# Patient Record
Sex: MALE | Race: OTHER | HISPANIC OR LATINO | URBAN - METROPOLITAN AREA
[De-identification: names, ages, dates, MRNs, and addresses within clinical notes are randomized per-mention and may not be internally consistent; named-entity substitution may affect disease eponyms.]

---

## 2023-09-12 ENCOUNTER — EMERGENCY (EMERGENCY)
Facility: HOSPITAL | Age: 33
LOS: 1 days | Discharge: HOME | End: 2023-09-12
Attending: PERSONAL EMERGENCY RESPONSE ATTENDANT | Admitting: PERSONAL EMERGENCY RESPONSE ATTENDANT
Payer: COMMERCIAL

## 2023-09-12 VITALS
SYSTOLIC BLOOD PRESSURE: 150 MMHG | TEMPERATURE: 98 F | HEART RATE: 69 BPM | OXYGEN SATURATION: 100 % | RESPIRATION RATE: 16 BRPM | DIASTOLIC BLOOD PRESSURE: 97 MMHG

## 2023-09-12 VITALS — TEMPERATURE: 99 F

## 2023-09-12 DIAGNOSIS — F41.9 ANXIETY DISORDER, UNSPECIFIED: ICD-10-CM

## 2023-09-12 LAB
ALBUMIN SERPL ELPH-MCNC: 4.7 G/DL — SIGNIFICANT CHANGE UP (ref 3.3–5)
ALP SERPL-CCNC: 102 U/L — SIGNIFICANT CHANGE UP (ref 40–120)
ALT FLD-CCNC: 21 U/L — SIGNIFICANT CHANGE UP (ref 4–41)
ANION GAP SERPL CALC-SCNC: 17 MMOL/L — HIGH (ref 7–14)
APPEARANCE UR: ABNORMAL
AST SERPL-CCNC: 20 U/L — SIGNIFICANT CHANGE UP (ref 4–40)
BASOPHILS # BLD AUTO: 0.04 K/UL — SIGNIFICANT CHANGE UP (ref 0–0.2)
BASOPHILS NFR BLD AUTO: 0.6 % — SIGNIFICANT CHANGE UP (ref 0–2)
BILIRUB SERPL-MCNC: 0.5 MG/DL — SIGNIFICANT CHANGE UP (ref 0.2–1.2)
BILIRUB UR-MCNC: NEGATIVE — SIGNIFICANT CHANGE UP
BUN SERPL-MCNC: 12 MG/DL — SIGNIFICANT CHANGE UP (ref 7–23)
CALCIUM SERPL-MCNC: 9.5 MG/DL — SIGNIFICANT CHANGE UP (ref 8.4–10.5)
CHLORIDE SERPL-SCNC: 99 MMOL/L — SIGNIFICANT CHANGE UP (ref 98–107)
CO2 SERPL-SCNC: 23 MMOL/L — SIGNIFICANT CHANGE UP (ref 22–31)
COLOR SPEC: YELLOW — SIGNIFICANT CHANGE UP
CREAT SERPL-MCNC: 0.82 MG/DL — SIGNIFICANT CHANGE UP (ref 0.5–1.3)
DIFF PNL FLD: NEGATIVE — SIGNIFICANT CHANGE UP
EGFR: 117 ML/MIN/1.73M2 — SIGNIFICANT CHANGE UP
EOSINOPHIL # BLD AUTO: 0.23 K/UL — SIGNIFICANT CHANGE UP (ref 0–0.5)
EOSINOPHIL NFR BLD AUTO: 3.3 % — SIGNIFICANT CHANGE UP (ref 0–6)
GLUCOSE SERPL-MCNC: 137 MG/DL — HIGH (ref 70–99)
GLUCOSE UR QL: NEGATIVE MG/DL — SIGNIFICANT CHANGE UP
HCT VFR BLD CALC: 43 % — SIGNIFICANT CHANGE UP (ref 39–50)
HGB BLD-MCNC: 14.8 G/DL — SIGNIFICANT CHANGE UP (ref 13–17)
IANC: 4.91 K/UL — SIGNIFICANT CHANGE UP (ref 1.8–7.4)
IMM GRANULOCYTES NFR BLD AUTO: 1.3 % — HIGH (ref 0–0.9)
KETONES UR-MCNC: NEGATIVE MG/DL — SIGNIFICANT CHANGE UP
LEUKOCYTE ESTERASE UR-ACNC: ABNORMAL
LYMPHOCYTES # BLD AUTO: 1.27 K/UL — SIGNIFICANT CHANGE UP (ref 1–3.3)
LYMPHOCYTES # BLD AUTO: 18.1 % — SIGNIFICANT CHANGE UP (ref 13–44)
MCHC RBC-ENTMCNC: 26.5 PG — LOW (ref 27–34)
MCHC RBC-ENTMCNC: 34.4 GM/DL — SIGNIFICANT CHANGE UP (ref 32–36)
MCV RBC AUTO: 76.9 FL — LOW (ref 80–100)
MONOCYTES # BLD AUTO: 0.49 K/UL — SIGNIFICANT CHANGE UP (ref 0–0.9)
MONOCYTES NFR BLD AUTO: 7 % — SIGNIFICANT CHANGE UP (ref 2–14)
NEUTROPHILS # BLD AUTO: 4.91 K/UL — SIGNIFICANT CHANGE UP (ref 1.8–7.4)
NEUTROPHILS NFR BLD AUTO: 69.7 % — SIGNIFICANT CHANGE UP (ref 43–77)
NITRITE UR-MCNC: NEGATIVE — SIGNIFICANT CHANGE UP
NRBC # BLD: 0 /100 WBCS — SIGNIFICANT CHANGE UP (ref 0–0)
NRBC # FLD: 0 K/UL — SIGNIFICANT CHANGE UP (ref 0–0)
PCP SPEC-MCNC: SIGNIFICANT CHANGE UP
PH UR: 8.5 (ref 5–8)
PLATELET # BLD AUTO: 243 K/UL — SIGNIFICANT CHANGE UP (ref 150–400)
POTASSIUM SERPL-MCNC: 3.5 MMOL/L — SIGNIFICANT CHANGE UP (ref 3.5–5.3)
POTASSIUM SERPL-SCNC: 3.5 MMOL/L — SIGNIFICANT CHANGE UP (ref 3.5–5.3)
PROT SERPL-MCNC: 7.6 G/DL — SIGNIFICANT CHANGE UP (ref 6–8.3)
PROT UR-MCNC: SIGNIFICANT CHANGE UP MG/DL
RBC # BLD: 5.59 M/UL — SIGNIFICANT CHANGE UP (ref 4.2–5.8)
RBC # FLD: 12.7 % — SIGNIFICANT CHANGE UP (ref 10.3–14.5)
SODIUM SERPL-SCNC: 139 MMOL/L — SIGNIFICANT CHANGE UP (ref 135–145)
SP GR SPEC: 1.02 — SIGNIFICANT CHANGE UP (ref 1–1.03)
TROPONIN T, HIGH SENSITIVITY RESULT: <6 NG/L — SIGNIFICANT CHANGE UP
UROBILINOGEN FLD QL: 0.2 MG/DL — SIGNIFICANT CHANGE UP (ref 0.2–1)
WBC # BLD: 7.03 K/UL — SIGNIFICANT CHANGE UP (ref 3.8–10.5)
WBC # FLD AUTO: 7.03 K/UL — SIGNIFICANT CHANGE UP (ref 3.8–10.5)

## 2023-09-12 PROCEDURE — 99285 EMERGENCY DEPT VISIT HI MDM: CPT

## 2023-09-12 PROCEDURE — 90792 PSYCH DIAG EVAL W/MED SRVCS: CPT

## 2023-09-12 PROCEDURE — 93010 ELECTROCARDIOGRAM REPORT: CPT

## 2023-09-12 NOTE — ED BEHAVIORAL HEALTH ASSESSMENT NOTE - DESCRIPTION
cooperative, in good behavioral control throughout ED course  Vital Signs Last 24 Hrs  T(C): 37 (12 Sep 2023 13:23), Max: 37 (12 Sep 2023 13:23)  T(F): 98.6 (12 Sep 2023 13:23), Max: 98.6 (12 Sep 2023 13:23)  HR: 69 (12 Sep 2023 10:51) (69 - 69)  BP: 150/97 (12 Sep 2023 10:51) (150/97 - 150/97)  BP(mean): --  RR: 16 (12 Sep 2023 10:51) (16 - 16)  SpO2: 100% (12 Sep 2023 10:51) (100% - 100%)    Parameters below as of 12 Sep 2023 10:51  Patient On (Oxygen Delivery Method): room air denies see HPI

## 2023-09-12 NOTE — ED PROVIDER NOTE - NSFOLLOWUPINSTRUCTIONS_ED_ALL_ED_FT
You were seen in our department for Anxiety  Follow up with your PMD in 48-72 hours for further monitoring.  Follow up with [facility] within 1 week for further evaluation.  if you develop any chest pain, dizziness, high fevers, weakness, numbness, tingling, vision changes, or any worsening symptoms return to our ED for evaluation.

## 2023-09-12 NOTE — ED BEHAVIORAL HEALTH ASSESSMENT NOTE - RISK ASSESSMENT
Risk factors include history of anxiety/panic attacks.  Protective factors include no suicide attempts, no violence history, medication compliance, no access to guns,  no substance abuse, willingness to seek help, no suicidal ideation or homicidal ideation, future-oriented, identifies reasons for living.  Pt is not at acutely elevated risk of harm to self or others.

## 2023-09-12 NOTE — ED PROVIDER NOTE - OBJECTIVE STATEMENT
Attending MD Estrada.  Pt is a 34 yo male with pmhx of anxiety on propranolol and fluoxetine and had been rxed clonazepam remotely but had discontinued x 6 mos but has been needing this medication again x 3-4 days since escalating anxiety and fear he will die x 2 wks.  Denies any HI or desire to harm others.  Endorses passive death visualization, fear and perseveration re: own mortality without overt SI.  Pt with anxious affect.  Follows with outpt psychaitrist but came to ED today because he intermittently feels he will die and and gets chest tightness.  Sxs occur without clear provocation.  Lifetime non-smoker, no personal cards risk factors/hx, no illicit drug use.  No family hx of heart disease at a young age.

## 2023-09-12 NOTE — ED BEHAVIORAL HEALTH ASSESSMENT NOTE - SUMMARY
34 y/o male, single, noncaregiver, lives with sister, employed as a , self-reported history of CHANTE, no past psych admissions, currently prescribed Fluvoxamine, Propranolol, and Klonopin by PMD, currently no outpatient psychiatrist or therapist, no h/o self-injurious behavior or suicide attempts, no h/o violence or legal issues, no PMH, no h/o substance abuse, self-presents for anxiety/panic attacks.   It appears that patient has been experiencing panic attacks. Pt denies suicidal ideation, intent, or plan. He denies homicidal or violent ideation, intent, or plan. He does not appear manic or psychotic. He denies persistently depressed mood or anhedonia. He is cooperative and in good behavioral control. Pt does not present an acute danger to self or others and does not meet criteria for involuntary psychiatric admission at this time. Pt declines voluntary psychiatric admission at this time.

## 2023-09-12 NOTE — ED ADULT TRIAGE NOTE - CHIEF COMPLAINT QUOTE
Pt c/o anxiety and panic attacks X 2 weeks. Has been compliant with anxiety medications. Says "I been thinking something bad is going to happen to me". Denies SI, HI, auditory/visual hallucinations, or drugs/alcohol. Hx of anxiety/depression.

## 2023-09-12 NOTE — ED BEHAVIORAL HEALTH ASSESSMENT NOTE - DETAILS
call 911 or return to ED if symptoms worsen or if pt develops thoughts of harming self or others self-referred denies

## 2023-09-12 NOTE — ED BEHAVIORAL HEALTH ASSESSMENT NOTE - HPI (INCLUDE ILLNESS QUALITY, SEVERITY, DURATION, TIMING, CONTEXT, MODIFYING FACTORS, ASSOCIATED SIGNS AND SYMPTOMS)
34 y/o male, single, noncaregiver, lives with sister, employed as a , self-reported history of CHNATE, no past psych admissions, currently prescribed Fluvoxamine, Propranolol, and Klonopin by PMD, currently no outpatient psychiatrist or therapist, no h/o self-injurious behavior or suicide attempts, no h/o violence or legal issues, no PMH, no h/o substance abuse, self-presents for anxiety/panic attacks.    Patient states he came to the ED because "sometimes I feel like I can't breathe." He reports a history of CHANTE but reports increased anxiety and worry over the past 2-3 weeks. He denies acute stressors. States he is worried that he has a cardiac or pulmonary condition because he sometimes gets short of breath and his heart races when he is acutely anxious. He also fears that he is going to die. Pt denies SI/HI. Pt states it has been harder for him to function at work recently due to the anxiety. He reports intermittent sadness but denies persistently depressed mood or anhedonia. He denies manic or psychotic symptoms. He denies homicidal or violent ideation, intent, or plan. He denies substance use. His PMD prescribes Fluvoxamine 200 mg po daily, Propranolol 10 mg po BID, and Klonopin 0.5 mg po QHS. States his outpatient psychiatrist moved to [place] about 1 year ago.

## 2023-09-12 NOTE — ED BEHAVIORAL HEALTH ASSESSMENT NOTE - SAFETY PLAN ADDT'L DETAILS
Safety plan discussed with.../Education provided regarding environmental safety / lethal means restriction/Provision of National Suicide Prevention Lifeline 2-525-446-LESV (2586)

## 2023-09-12 NOTE — ED PROVIDER NOTE - CLINICAL SUMMARY MEDICAL DECISION MAKING FREE TEXT BOX
Attending MD Estrada. Pt well appearing anxious 34 yo male with pmhx anxiety.  Pt without overt SI/HI but perseveration re: his own death.  Planned psych eval per pt request 2/2 dysfunction, persistent perseveration despite outpt care.  Planned screening labs/trop/EKG in otherwise low risk 34 yo male.  No LE edema on exam.

## 2023-09-12 NOTE — ED PROVIDER NOTE - PATIENT PORTAL LINK FT
You can access the FollowMyHealth Patient Portal offered by [facility] by registering at the following website: [email or web address]. By joining [facility]’s FollowMyHealth portal, you will also be able to view your health information using other applications (apps) compatible with our system.

## 2023-09-12 NOTE — ED PROVIDER NOTE - NSFOLLOWUPCLINICS_GEN_ALL_ED_FT
Cardiology at [facility]  Cardiology  [address] [place], [place] [id number]  Phone: [phone]  Fax:

## 2023-09-12 NOTE — ED PROVIDER NOTE - PROGRESS NOTE DETAILS
TIMMY DUNHAM: pt signed out to me, here w/ anxiety, and impending fear he will die, a/w intermittent episodes of chest tightness. Patient pending psych recs and labs. labs wnl, trop<6, ECG NSR nonischemic. psych cleared, will dc w/ outpatient crisis follow up. appt set up tomorrow via social work.

## 2023-09-12 NOTE — ED ADULT NURSE NOTE - OBJECTIVE STATEMENT
Pt arrives via walk-in for c/o worsening anxiety with panic attacks and chest discomfort. Pt is primarily Northern [place] speaking, interviewed with  ipad. Pt deneis si,hi,ah,vh,etoh, drug use .

## 2023-09-13 ENCOUNTER — OUTPATIENT (OUTPATIENT)
Dept: OUTPATIENT SERVICES | Facility: HOSPITAL | Age: 33
LOS: 1 days | Discharge: STILL ADMITTED OR ADMINISTRATIVE | End: 2023-09-13
Payer: COMMERCIAL

## 2023-09-13 PROCEDURE — 90836 PSYTX W PT W E/M 45 MIN: CPT

## 2023-09-13 PROCEDURE — 99214 OFFICE O/P EST MOD 30 MIN: CPT

## 2023-09-15 DIAGNOSIS — F33.1 MAJOR DEPRESSIVE DISORDER, RECURRENT, MODERATE: ICD-10-CM

## 2023-09-15 DIAGNOSIS — F41.1 GENERALIZED ANXIETY DISORDER: ICD-10-CM

## 2023-09-29 PROCEDURE — 90833 PSYTX W PT W E/M 30 MIN: CPT

## 2023-09-29 PROCEDURE — 99214 OFFICE O/P EST MOD 30 MIN: CPT
